# Patient Record
Sex: MALE | URBAN - METROPOLITAN AREA
[De-identification: names, ages, dates, MRNs, and addresses within clinical notes are randomized per-mention and may not be internally consistent; named-entity substitution may affect disease eponyms.]

---

## 2018-02-16 ENCOUNTER — IMPORTED ENCOUNTER (OUTPATIENT)
Dept: URBAN - METROPOLITAN AREA CLINIC 9 | Facility: CLINIC | Age: 78
End: 2018-02-16

## 2018-07-31 ENCOUNTER — IMPORTED ENCOUNTER (OUTPATIENT)
Dept: URBAN - METROPOLITAN AREA CLINIC 9 | Facility: CLINIC | Age: 78
End: 2018-07-31

## 2019-02-07 ENCOUNTER — IMPORTED ENCOUNTER (OUTPATIENT)
Dept: URBAN - METROPOLITAN AREA CLINIC 9 | Facility: CLINIC | Age: 79
End: 2019-02-07

## 2019-02-08 ENCOUNTER — IMPORTED ENCOUNTER (OUTPATIENT)
Dept: URBAN - METROPOLITAN AREA CLINIC 9 | Facility: CLINIC | Age: 79
End: 2019-02-08

## 2019-02-18 ENCOUNTER — IMPORTED ENCOUNTER (OUTPATIENT)
Dept: URBAN - METROPOLITAN AREA CLINIC 9 | Facility: CLINIC | Age: 79
End: 2019-02-18

## 2019-02-20 ENCOUNTER — IMPORTED ENCOUNTER (OUTPATIENT)
Dept: URBAN - METROPOLITAN AREA CLINIC 9 | Facility: CLINIC | Age: 79
End: 2019-02-20

## 2019-02-28 ENCOUNTER — IMPORTED ENCOUNTER (OUTPATIENT)
Dept: URBAN - METROPOLITAN AREA CLINIC 9 | Facility: CLINIC | Age: 79
End: 2019-02-28

## 2019-03-07 ENCOUNTER — IMPORTED ENCOUNTER (OUTPATIENT)
Dept: URBAN - METROPOLITAN AREA CLINIC 9 | Facility: CLINIC | Age: 79
End: 2019-03-07

## 2019-03-14 ENCOUNTER — IMPORTED ENCOUNTER (OUTPATIENT)
Dept: URBAN - METROPOLITAN AREA CLINIC 9 | Facility: CLINIC | Age: 79
End: 2019-03-14

## 2019-04-10 ENCOUNTER — IMPORTED ENCOUNTER (OUTPATIENT)
Dept: URBAN - METROPOLITAN AREA CLINIC 9 | Facility: CLINIC | Age: 79
End: 2019-04-10

## 2021-10-16 ASSESSMENT — TONOMETRY
OS_IOP_MMHG: 11
OS_IOP_MMHG: 14
OS_IOP_MMHG: 19
OS_IOP_MMHG: 13
OS_IOP_MMHG: 18
OD_IOP_MMHG: 15
OS_IOP_MMHG: 15
OS_IOP_MMHG: 14
OD_IOP_MMHG: 11
OD_IOP_MMHG: 8
OD_IOP_MMHG: 14
OD_IOP_MMHG: 14
OD_IOP_MMHG: 13
OD_IOP_MMHG: 12

## 2021-10-16 ASSESSMENT — KERATOMETRY
OS_K2POWER_DIOPTERS: 46.5
OD_AXISANGLE_DEGREES: 95
OD_K2POWER_DIOPTERS: 47.75
OD_AXISANGLE_DEGREES: 94
OD_K1POWER_DIOPTERS: 41.5
OS_AXISANGLE_DEGREES: 90
OS_AXISANGLE2_DEGREES: 180
OS_AXISANGLE2_DEGREES: 4
OD_K2POWER_DIOPTERS: 47.75
OS_K2POWER_DIOPTERS: 46.75
OS_K1POWER_DIOPTERS: 41.5
OD_AXISANGLE2_DEGREES: 4
OS_K1POWER_DIOPTERS: 42.25
OS_AXISANGLE_DEGREES: 94
OD_K1POWER_DIOPTERS: 42
OD_AXISANGLE2_DEGREES: 5

## 2021-10-16 ASSESSMENT — VISUAL ACUITY
OS_SC: CF 2FT SN
OS_CC: 20/30 SN
OD_SC: 20/200 SN
OD_CC: 20/40 SN
OD_SC: CF 2FT SN
OS_CC: 20/40 SN
OS_SC: 20/400 SN
OD_CC: 20/30 SN
OD_CC: 20/50 SN
OD_SC: 20/400 SN
OD_SC: 20/200 SN
OD_PH: 20/40 SN
OS_CC: 20/40 SN
OS_SC: CF 2FT SN
OD_PH: 20/80 SN
OD_CC: 20/40 SN
OD_CC: 20/50 + SN
OD_CC: 20/40 SN
OS_SC: CF 2FT SN
OD_SC: CF 2FT SN
OS_CC: 20/40 SN
OS_CC: 20/40 SN
OS_SC: 20/200 SN
OD_CC: 20/40 SN
OS_CC: 20/40 SN
OD_SC: CF 2FT SN
OS_CC: 20/60 SN
OS_SC: 20/200 SN
